# Patient Record
Sex: MALE | URBAN - METROPOLITAN AREA
[De-identification: names, ages, dates, MRNs, and addresses within clinical notes are randomized per-mention and may not be internally consistent; named-entity substitution may affect disease eponyms.]

---

## 2017-09-18 ENCOUNTER — EMERGENCY (EMERGENCY)
Facility: HOSPITAL | Age: 62
LOS: 1 days | Discharge: HOME | End: 2017-09-18
Admitting: EMERGENCY MEDICINE
Payer: MEDICAID

## 2017-09-18 VITALS
TEMPERATURE: 98 F | OXYGEN SATURATION: 100 % | SYSTOLIC BLOOD PRESSURE: 142 MMHG | HEART RATE: 76 BPM | DIASTOLIC BLOOD PRESSURE: 79 MMHG | RESPIRATION RATE: 18 BRPM

## 2017-09-18 DIAGNOSIS — F20.9 SCHIZOPHRENIA, UNSPECIFIED: ICD-10-CM

## 2017-09-18 DIAGNOSIS — F10.20 ALCOHOL DEPENDENCE, UNCOMPLICATED: ICD-10-CM

## 2017-09-18 DIAGNOSIS — F10.10 ALCOHOL ABUSE, UNCOMPLICATED: ICD-10-CM

## 2017-09-18 DIAGNOSIS — F18.10 INHALANT ABUSE, UNCOMPLICATED: ICD-10-CM

## 2017-09-18 DIAGNOSIS — F12.10 CANNABIS ABUSE, UNCOMPLICATED: ICD-10-CM

## 2017-09-18 LAB
ALBUMIN SERPL ELPH-MCNC: 4.4 G/DL — SIGNIFICANT CHANGE UP (ref 3.3–5)
ALP SERPL-CCNC: 72 U/L — SIGNIFICANT CHANGE UP (ref 40–120)
ALT FLD-CCNC: 18 U/L — SIGNIFICANT CHANGE UP (ref 4–41)
AMPHET UR-MCNC: NEGATIVE — SIGNIFICANT CHANGE UP
APAP SERPL-MCNC: < 15 UG/ML — LOW (ref 15–25)
APPEARANCE UR: CLEAR — SIGNIFICANT CHANGE UP
AST SERPL-CCNC: 26 U/L — SIGNIFICANT CHANGE UP (ref 4–40)
BARBITURATES MEASUREMENT: NEGATIVE — SIGNIFICANT CHANGE UP
BARBITURATES UR SCN-MCNC: NEGATIVE — SIGNIFICANT CHANGE UP
BASOPHILS # BLD AUTO: 0.05 K/UL — SIGNIFICANT CHANGE UP (ref 0–0.2)
BASOPHILS NFR BLD AUTO: 0.8 % — SIGNIFICANT CHANGE UP (ref 0–2)
BENZODIAZ SERPL-MCNC: NEGATIVE — SIGNIFICANT CHANGE UP
BENZODIAZ UR-MCNC: NEGATIVE — SIGNIFICANT CHANGE UP
BILIRUB SERPL-MCNC: 0.4 MG/DL — SIGNIFICANT CHANGE UP (ref 0.2–1.2)
BILIRUB UR-MCNC: NEGATIVE — SIGNIFICANT CHANGE UP
BLOOD UR QL VISUAL: NEGATIVE — SIGNIFICANT CHANGE UP
BUN SERPL-MCNC: 12 MG/DL — SIGNIFICANT CHANGE UP (ref 7–23)
CALCIUM SERPL-MCNC: 9.6 MG/DL — SIGNIFICANT CHANGE UP (ref 8.4–10.5)
CANNABINOIDS UR-MCNC: POSITIVE — SIGNIFICANT CHANGE UP
CHLORIDE SERPL-SCNC: 106 MMOL/L — SIGNIFICANT CHANGE UP (ref 98–107)
CO2 SERPL-SCNC: 22 MMOL/L — SIGNIFICANT CHANGE UP (ref 22–31)
COCAINE METAB.OTHER UR-MCNC: NEGATIVE — SIGNIFICANT CHANGE UP
COLOR SPEC: YELLOW — SIGNIFICANT CHANGE UP
CREAT SERPL-MCNC: 0.92 MG/DL — SIGNIFICANT CHANGE UP (ref 0.5–1.3)
EOSINOPHIL # BLD AUTO: 0.12 K/UL — SIGNIFICANT CHANGE UP (ref 0–0.5)
EOSINOPHIL NFR BLD AUTO: 1.8 % — SIGNIFICANT CHANGE UP (ref 0–6)
ETHANOL BLD-MCNC: < 10 MG/DL — SIGNIFICANT CHANGE UP
GLUCOSE SERPL-MCNC: 90 MG/DL — SIGNIFICANT CHANGE UP (ref 70–99)
GLUCOSE UR-MCNC: NEGATIVE — SIGNIFICANT CHANGE UP
HCT VFR BLD CALC: 40.9 % — SIGNIFICANT CHANGE UP (ref 39–50)
HGB BLD-MCNC: 12.9 G/DL — LOW (ref 13–17)
IMM GRANULOCYTES # BLD AUTO: 0.01 # — SIGNIFICANT CHANGE UP
IMM GRANULOCYTES NFR BLD AUTO: 0.2 % — SIGNIFICANT CHANGE UP (ref 0–1.5)
KETONES UR-MCNC: SIGNIFICANT CHANGE UP
LEUKOCYTE ESTERASE UR-ACNC: NEGATIVE — SIGNIFICANT CHANGE UP
LYMPHOCYTES # BLD AUTO: 3.26 K/UL — SIGNIFICANT CHANGE UP (ref 1–3.3)
LYMPHOCYTES # BLD AUTO: 49.5 % — HIGH (ref 13–44)
MCHC RBC-ENTMCNC: 25.9 PG — LOW (ref 27–34)
MCHC RBC-ENTMCNC: 31.5 % — LOW (ref 32–36)
MCV RBC AUTO: 82 FL — SIGNIFICANT CHANGE UP (ref 80–100)
METHADONE UR-MCNC: NEGATIVE — SIGNIFICANT CHANGE UP
MONOCYTES # BLD AUTO: 0.55 K/UL — SIGNIFICANT CHANGE UP (ref 0–0.9)
MONOCYTES NFR BLD AUTO: 8.4 % — SIGNIFICANT CHANGE UP (ref 2–14)
MUCOUS THREADS # UR AUTO: SIGNIFICANT CHANGE UP
NEUTROPHILS # BLD AUTO: 2.59 K/UL — SIGNIFICANT CHANGE UP (ref 1.8–7.4)
NEUTROPHILS NFR BLD AUTO: 39.3 % — LOW (ref 43–77)
NITRITE UR-MCNC: NEGATIVE — SIGNIFICANT CHANGE UP
NON-SQ EPI CELLS # UR AUTO: <1 — SIGNIFICANT CHANGE UP
NRBC # FLD: 0 — SIGNIFICANT CHANGE UP
OPIATES UR-MCNC: NEGATIVE — SIGNIFICANT CHANGE UP
OXYCODONE UR-MCNC: NEGATIVE — SIGNIFICANT CHANGE UP
PCP UR-MCNC: NEGATIVE — SIGNIFICANT CHANGE UP
PH UR: 6 — SIGNIFICANT CHANGE UP (ref 4.6–8)
PLATELET # BLD AUTO: 297 K/UL — SIGNIFICANT CHANGE UP (ref 150–400)
PMV BLD: 8.6 FL — SIGNIFICANT CHANGE UP (ref 7–13)
POTASSIUM SERPL-MCNC: 4.3 MMOL/L — SIGNIFICANT CHANGE UP (ref 3.5–5.3)
POTASSIUM SERPL-SCNC: 4.3 MMOL/L — SIGNIFICANT CHANGE UP (ref 3.5–5.3)
PROT SERPL-MCNC: 7.9 G/DL — SIGNIFICANT CHANGE UP (ref 6–8.3)
PROT UR-MCNC: 20 — SIGNIFICANT CHANGE UP
RBC # BLD: 4.99 M/UL — SIGNIFICANT CHANGE UP (ref 4.2–5.8)
RBC # FLD: 15.9 % — HIGH (ref 10.3–14.5)
RBC CASTS # UR COMP ASSIST: SIGNIFICANT CHANGE UP (ref 0–?)
SALICYLATES SERPL-MCNC: < 5 MG/DL — LOW (ref 15–30)
SODIUM SERPL-SCNC: 144 MMOL/L — SIGNIFICANT CHANGE UP (ref 135–145)
SP GR SPEC: 1.02 — SIGNIFICANT CHANGE UP (ref 1–1.03)
SQUAMOUS # UR AUTO: SIGNIFICANT CHANGE UP
T3 SERPL-MCNC: 140 NG/DL — SIGNIFICANT CHANGE UP (ref 80–200)
T4 AB SER-ACNC: 8.07 UG/DL — SIGNIFICANT CHANGE UP (ref 5.1–13)
TSH SERPL-MCNC: 5.45 UIU/ML — HIGH (ref 0.27–4.2)
UROBILINOGEN FLD QL: 1 E.U. — SIGNIFICANT CHANGE UP (ref 0.1–0.2)
WBC # BLD: 6.58 K/UL — SIGNIFICANT CHANGE UP (ref 3.8–10.5)
WBC # FLD AUTO: 6.58 K/UL — SIGNIFICANT CHANGE UP (ref 3.8–10.5)
WBC UR QL: SIGNIFICANT CHANGE UP (ref 0–?)

## 2017-09-18 PROCEDURE — 90792 PSYCH DIAG EVAL W/MED SRVCS: CPT

## 2017-09-18 PROCEDURE — 99285 EMERGENCY DEPT VISIT HI MDM: CPT

## 2017-09-18 NOTE — ED BEHAVIORAL HEALTH ASSESSMENT NOTE - SUICIDE PROTECTIVE FACTORS
Responsibility to family and others/Future oriented/Fear of death or dying due to pain/suffering/Identifies reasons for living/Supportive social network or family

## 2017-09-18 NOTE — ED PROVIDER NOTE - MEDICAL DECISION MAKING DETAILS
62y Male hx of schizophrenia, substance abuse referred to ED for psych eval 2/2 erratic behavior in context of alcohol and marijuana used today.  Pt is calm and cooperative, alert and oriented, no signs of intoxication or withdrawal at time of evaluation and no injuries on exam-  Medically clear or psych disposition.

## 2017-09-18 NOTE — ED BEHAVIORAL HEALTH ASSESSMENT NOTE - RISK ASSESSMENT
Protective factors include no suicide attempts, supportive housing, no access to guns, no global insomnia, supportive family, willingness to seek help, no suicidal ideation, no homicidal ideation, hopefulness for future. Chronic risk factors include chronic substance use, schizophrenia diagnosis, male gender, prior admissions. While patient has risk factors, they are chronic rather than acute. Patient was notified that using substances can lead to an exacerbation of psychotic sx & lead to death/injury. He verbalizes understanding and at time of discharge is linear/logical/organized & sober.

## 2017-09-18 NOTE — ED PROVIDER NOTE - PLAN OF CARE
Return to the ER immediately for any worsening symptoms, concerns, chest pain, fevers, shortness of breath, vomiting, abdominal pain, rashes, neck pain, back pain, numbness, paresthesias, pain or any difficulties at all.  Please follow up with your own private physician or our medical clinic at [phone] in the next 2-3 days.  Find a doctor at [phone].    Please make sure to take your test results with you to your doctor.   Your Thyroid function test is elevated. Return to the ER immediately for any worsening symptoms, concerns, chest pain, fevers, shortness of breath, vomiting, abdominal pain, rashes, neck pain, back pain, numbness, paresthesias, pain or any difficulties at all.  Please follow up with your own private physician or our medical clinic at [phone] in the next 2-3 days.  Find a doctor at [phone].    Please make sure to discuss your test results with your doctor.   Your Thyroid function test is elevated.

## 2017-09-18 NOTE — ED PROVIDER NOTE - CHPI ED SYMPTOMS NEG
no weakness/no agitation/no disorientation/no paranoia/no suicidal/no change in level of consciousness/no hallucinations/no weight loss/no homicidal/no confusion

## 2017-09-18 NOTE — ED BEHAVIORAL HEALTH ASSESSMENT NOTE - SUMMARY
61 y/o single [place]i American male, domiciled in the [facility] program, has a roommate, disabled, history of Schizophrenia, K2 use, Cannabis Abuse, Alcohol Abuse, prior psychiatric hospitalizations, recently in substance abuse rehab for K2/Marijuana/alcohol (discharged on Friday), no known suicide attempts, denies violence hx, no known legal issues, uses K2, alcohol regularly, marijuana regularly, denies hx of DTs, denies PMH, BIB EMS from [facility] program after patient was behaving erratically likely in context of substance abuse. Patient is noted to be positive for cannabis today, and admits to smoking marijuana today and drinking alcohol/smoking marijuana this weekend.  In ED, patient admits to using cannabis today & alcohol/cannabis this weekend. Regardless, he is not interested in rehab, and does not want to re-enroll in detox at present. Writer discussed different options for rehab/substance treatment if patient changes his mind & gave him a list of referrals if he wishes to use them. He denies psychotic, manic, depressive sx & denies Si/HI. Referent was called, but was not available, and they did not call ahead nor send any information as to why patient was referred today. EMS reported patient was behaving erratically, but this behavior was not observed in the ED as patient was calm, cooperative, pleasant, appropriate, did not require medications & kept to himself.  who spoke with writer had concerns about pt's substance use, but no concerns as far as safety (suicidality/homicidality) and did not have any issue with patient being discharged home tonight.  Patient agrees to return if he has si/hi or worsening symptoms.

## 2017-09-18 NOTE — ED ADULT TRIAGE NOTE - CHIEF COMPLAINT QUOTE
pt BIBA from [facility].  as per staff, pt has been acting "very manic".  pt having racing thoughts.

## 2017-09-18 NOTE — ED BEHAVIORAL HEALTH ASSESSMENT NOTE - HPI (INCLUDE ILLNESS QUALITY, SEVERITY, DURATION, TIMING, CONTEXT, MODIFYING FACTORS, ASSOCIATED SIGNS AND SYMPTOMS)
61 y/o single [place] American male, domiciled in the [facility] program, has a roommate, disabled, history of Schizophrenia, K2 use, Cannabis Abuse, Alcohol Abuse, prior psychiatric hospitalizations, recently in substance abuse rehab for K2/Marijuana/alcohol (discharged on Friday), no known suicide attempts, denies violence hx, no known legal issues, uses K2, alcohol regularly, marijuana regularly, denies hx of DTs, denies PMH, BIB EMS from [facility] program after patient was behaving erratically likely in context of substance abuse. Patient is noted to be positive for cannabis today, and admits to smoking marijuana today and drinking alcohol/smoking marijuana this weekend.    Writer called the [facility] program who referred patient, but they closed at 5pm.    Writer spoke to pt's care manager, Rashmi Oneill [phone]. She states patient was referred to the hospital by his outpatient clinic after he presented there "off his baseline". She states patient was not aggressive nor suicidal, but she states he was not making sense and was possibly intoxicated. She states patient was sent to substance abuse detox/rehab at [facility] on Friday for treatment but signed himself out under unclear circumstances on the same day. She states patient returned to the [facility] apartment program the same day. She states they are concerned by patient's substance use as he has had trouble quitting drinking/drugs & they are hoping he can re-enroll in detox. Writer told her that patient is now sober after spending 5 hours in the ED & we cannot force patient to enroll in rehab, but advised her that we will offer him treatment & help facilitate rehab if he is accepting of the treatment. She states they will f/u with patient tomorrow at his residence if he is discharged tonight.    In ED, patient is calm, cooperative, pleasant and well related. He admits to leaving the rehab, but he claims that he left because he had an appointment today. He states that he does drink alcohol regularly, but he does not feel it is a problem, and admits to smoking marijuana today. He declines any type of substance abuse treatment, stating he feels good and doesn't need help. Patient denies any hallucinations, does not report any delusional thought content, denies thought insertion/withdrawal, denies referential thought processes & is not paranoid on interview. Pt is linear, logical, organized and well related. Patient does not report nor exhibit any signs of niharika, including irritable or elevated mood, grandiosity, pressured speech, risk-taking behaviors, increase in productivity or agitation. Patient denies any depressive symptoms including depressed mood, anhedonia, changes in energy/concentration/appetite, sleep disturbances, preoccupation with death or feelings of guilt. He denies any SI, intent or plan. He denies any HI, violent thoughts. He states he likes his housing, has a nice Belgian roommate who he gets along well with & states he feels safe at home. He reports enjoying [place] cooking & states he is happy here in [place] though he misses his country. He is future oriented, stating that in the next few years he would like to go visit his country as he has family living there still.

## 2017-09-18 NOTE — ED BEHAVIORAL HEALTH ASSESSMENT NOTE - CASE SUMMARY
63 y/o single [place] American male, domiciled in the [facility] program, has a roommate, disabled, history of Schizophrenia, K2 use, Cannabis Abuse, Alcohol Abuse, prior psychiatric hospitalizations, recently in substance abuse rehab for K2/Marijuana/alcohol (discharged on Friday), no known suicide attempts, denies violence hx, no known legal issues, uses K2, alcohol regularly, marijuana regularly, denies hx of DTs, denies PMH, BIB EMS from [facility] program after patient was behaving erratically likely in context of substance abuse. Patient is noted to be positive for cannabis today, and admits to smoking marijuana today and drinking alcohol/smoking marijuana this weekend.  In ED, patient admits to using cannabis today & alcohol/cannabis this weekend. Regardless, he is not interested in rehab, and does not want to re-enroll in detox at present. Writer discussed different options for rehab/substance treatment if patient changes his mind & gave him a list of referrals if he wishes to use them. He denies psychotic, manic, depressive sx & denies Si/HI. Referent was called, but was not available, and they did not call ahead nor send any information as to why patient was referred today. EMS reported patient was behaving erratically, but this behavior was not observed in the ED as patient was calm, cooperative, pleasant, appropriate, did not require medications & kept to himself.  who spoke with writer had concerns about pt's substance use, but no concerns as far as safety (suicidality/homicidality) and did not have any issue with patient being discharged home tonight.  Patient agrees to return if he has si/hi or worsening symptoms.

## 2017-09-18 NOTE — ED PROVIDER NOTE - OBJECTIVE STATEMENT
The patient is a 62y Male hx of schizophrenia, substance abuse referred to ED for psych eval 2/2 erratic behavior in context of alcohol and marijuana used today. Pt denies all medical complaints at present, no recent injuries, trauma or falls, no headache, back or neck pain, no abd/flank pain, no uti/urinary symptoms, nausea or vomiting, no fever or chills, no cp or sob, no palpitations or diaphoresis.  Denies SI/HI, no AVH.

## 2017-09-18 NOTE — ED PROVIDER NOTE - CARE PLAN
Instructions for follow-up, activity and diet:	Return to the ER immediately for any worsening symptoms, concerns, chest pain, fevers, shortness of breath, vomiting, abdominal pain, rashes, neck pain, back pain, numbness, paresthesias, pain or any difficulties at all.  Please follow up with your own private physician or our medical clinic at [phone] in the next 2-3 days.  Find a doctor at [phone].    Please make sure to take your test results with you to your doctor.   Your Thyroid function test is elevated. Instructions for follow-up, activity and diet:	Return to the ER immediately for any worsening symptoms, concerns, chest pain, fevers, shortness of breath, vomiting, abdominal pain, rashes, neck pain, back pain, numbness, paresthesias, pain or any difficulties at all.  Please follow up with your own private physician or our medical clinic at [phone] in the next 2-3 days.  Find a doctor at [phone].    Please make sure to discuss your test results with your doctor.   Your Thyroid function test is elevated. Principal Discharge DX:	Schizophrenia, unspecified type  Instructions for follow-up, activity and diet:	Return to the ER immediately for any worsening symptoms, concerns, chest pain, fevers, shortness of breath, vomiting, abdominal pain, rashes, neck pain, back pain, numbness, paresthesias, pain or any difficulties at all.  Please follow up with your own private physician or our medical clinic at [phone] in the next 2-3 days.  Find a doctor at [phone].    Please make sure to discuss your test results with your doctor.   Your Thyroid function test is elevated.

## 2017-09-18 NOTE — ED BEHAVIORAL HEALTH ASSESSMENT NOTE - REASON FOR REFERRAL
BIB EMS after staff at [facility] called 911 as patient was behaving erratically

## 2018-02-26 ENCOUNTER — OUTPATIENT (OUTPATIENT)
Dept: OUTPATIENT SERVICES | Facility: HOSPITAL | Age: 63
LOS: 1 days | Discharge: HOME | End: 2018-02-26

## 2018-02-26 DIAGNOSIS — F10.20 ALCOHOL DEPENDENCE, UNCOMPLICATED: ICD-10-CM

## 2018-02-26 DIAGNOSIS — F20.9 SCHIZOPHRENIA, UNSPECIFIED: ICD-10-CM

## 2018-07-01 ENCOUNTER — OUTPATIENT (OUTPATIENT)
Dept: OUTPATIENT SERVICES | Facility: HOSPITAL | Age: 63
LOS: 1 days | End: 2018-07-01
Payer: MEDICAID

## 2018-07-16 DIAGNOSIS — Z71.89 OTHER SPECIFIED COUNSELING: ICD-10-CM

## 2018-07-16 PROBLEM — F20.9 SCHIZOPHRENIA, UNSPECIFIED: Chronic | Status: ACTIVE | Noted: 2017-09-25

## 2018-07-16 PROBLEM — F19.10 OTHER PSYCHOACTIVE SUBSTANCE ABUSE, UNCOMPLICATED: Chronic | Status: ACTIVE | Noted: 2017-09-25

## 2020-06-01 ENCOUNTER — OUTPATIENT (OUTPATIENT)
Dept: OUTPATIENT SERVICES | Facility: HOSPITAL | Age: 65
LOS: 1 days | End: 2020-06-01
Payer: MEDICAID

## 2020-06-01 PROCEDURE — H0002: CPT

## 2020-10-21 DIAGNOSIS — Z71.89 OTHER SPECIFIED COUNSELING: ICD-10-CM

## 2021-10-01 PROCEDURE — G9005: CPT

## 2022-04-14 NOTE — ED BEHAVIORAL HEALTH ASSESSMENT NOTE - NS ED [facility] DEMOGRAPHICS MEDICAL RECORD REVIEWED YES RECORDS
Watch blood pressure a bit more closely.     Bring your machine in with you to have it checked for accuracy with the nurse, just make a nurse only visit to do that.       Hospital chart
